# Patient Record
Sex: MALE | Race: WHITE | NOT HISPANIC OR LATINO | Employment: OTHER | URBAN - METROPOLITAN AREA
[De-identification: names, ages, dates, MRNs, and addresses within clinical notes are randomized per-mention and may not be internally consistent; named-entity substitution may affect disease eponyms.]

---

## 2023-06-25 ENCOUNTER — TELEPHONE (OUTPATIENT)
Dept: BEHAVIORAL HEALTH | Facility: CLINIC | Age: 43
End: 2023-06-25

## 2023-06-25 ENCOUNTER — TELEPHONE (OUTPATIENT)
Dept: BEHAVIORAL HEALTH | Facility: HOSPITAL | Age: 43
End: 2023-06-25

## 2023-06-25 ENCOUNTER — HOSPITAL ENCOUNTER (INPATIENT)
Facility: HOSPITAL | Age: 43
LOS: 3 days | Discharge: HOME | DRG: 885 | End: 2023-06-28
Attending: STUDENT IN AN ORGANIZED HEALTH CARE EDUCATION/TRAINING PROGRAM | Admitting: STUDENT IN AN ORGANIZED HEALTH CARE EDUCATION/TRAINING PROGRAM
Payer: MEDICARE

## 2023-06-25 PROBLEM — R45.851 SUICIDAL IDEATION: Status: ACTIVE | Noted: 2023-06-25

## 2023-06-25 PROCEDURE — 250N000013 HC RX MED GY IP 250 OP 250 PS 637: Performed by: NURSE PRACTITIONER

## 2023-06-25 PROCEDURE — 124N000001 HC R&B MH

## 2023-06-25 RX ORDER — OLANZAPINE 5 MG/1
5 TABLET ORAL AT BEDTIME
Status: DISCONTINUED | OUTPATIENT
Start: 2023-06-25 | End: 2023-06-28 | Stop reason: HOSPADM

## 2023-06-25 RX ORDER — LAMOTRIGINE 200 MG/1
200 TABLET ORAL AT BEDTIME
COMMUNITY

## 2023-06-25 RX ORDER — NICOTINE 21 MG/24HR
1 PATCH, TRANSDERMAL 24 HOURS TRANSDERMAL DAILY
Status: DISCONTINUED | OUTPATIENT
Start: 2023-06-25 | End: 2023-06-28 | Stop reason: HOSPADM

## 2023-06-25 RX ORDER — LAMOTRIGINE 25 MG/1
25 TABLET ORAL EVERY MORNING
COMMUNITY

## 2023-06-25 RX ORDER — FOLIC ACID 1 MG/1
1 TABLET ORAL EVERY MORNING
COMMUNITY

## 2023-06-25 RX ORDER — LANOLIN ALCOHOL/MO/W.PET/CERES
3 CREAM (GRAM) TOPICAL
Status: DISCONTINUED | OUTPATIENT
Start: 2023-06-25 | End: 2023-06-28 | Stop reason: HOSPADM

## 2023-06-25 RX ORDER — LAMOTRIGINE 100 MG/1
200 TABLET ORAL AT BEDTIME
Status: DISCONTINUED | OUTPATIENT
Start: 2023-06-25 | End: 2023-06-28 | Stop reason: HOSPADM

## 2023-06-25 RX ORDER — DIVALPROEX SODIUM 500 MG/1
1500 TABLET, EXTENDED RELEASE ORAL DAILY
Status: DISCONTINUED | OUTPATIENT
Start: 2023-06-25 | End: 2023-06-28 | Stop reason: HOSPADM

## 2023-06-25 RX ORDER — NICOTINE 21 MG/24HR
1 PATCH, TRANSDERMAL 24 HOURS TRANSDERMAL DAILY PRN
Status: ON HOLD | COMMUNITY
End: 2023-06-28

## 2023-06-25 RX ORDER — OLANZAPINE 10 MG/1
10 TABLET ORAL 3 TIMES DAILY PRN
Status: DISCONTINUED | OUTPATIENT
Start: 2023-06-25 | End: 2023-06-28 | Stop reason: HOSPADM

## 2023-06-25 RX ORDER — HYDROXYZINE HYDROCHLORIDE 25 MG/1
25 TABLET, FILM COATED ORAL EVERY 4 HOURS PRN
Status: DISCONTINUED | OUTPATIENT
Start: 2023-06-25 | End: 2023-06-28 | Stop reason: HOSPADM

## 2023-06-25 RX ORDER — OLANZAPINE 10 MG/1
5 TABLET ORAL AT BEDTIME
COMMUNITY

## 2023-06-25 RX ORDER — DIVALPROEX SODIUM 500 MG/1
1500 TABLET, EXTENDED RELEASE ORAL AT BEDTIME
COMMUNITY

## 2023-06-25 RX ORDER — HYDROXYZINE PAMOATE 25 MG/1
25 CAPSULE ORAL 3 TIMES DAILY PRN
COMMUNITY

## 2023-06-25 RX ORDER — MAGNESIUM HYDROXIDE/ALUMINUM HYDROXICE/SIMETHICONE 120; 1200; 1200 MG/30ML; MG/30ML; MG/30ML
30 SUSPENSION ORAL EVERY 4 HOURS PRN
Status: DISCONTINUED | OUTPATIENT
Start: 2023-06-25 | End: 2023-06-28 | Stop reason: HOSPADM

## 2023-06-25 RX ORDER — ACETAMINOPHEN 325 MG/1
650 TABLET ORAL EVERY 4 HOURS PRN
Status: DISCONTINUED | OUTPATIENT
Start: 2023-06-25 | End: 2023-06-28 | Stop reason: HOSPADM

## 2023-06-25 RX ORDER — NALTREXONE HYDROCHLORIDE 50 MG/1
100 TABLET, FILM COATED ORAL DAILY
Status: DISCONTINUED | OUTPATIENT
Start: 2023-06-25 | End: 2023-06-28 | Stop reason: HOSPADM

## 2023-06-25 RX ORDER — GABAPENTIN 400 MG/1
400 CAPSULE ORAL AT BEDTIME
Status: DISCONTINUED | OUTPATIENT
Start: 2023-06-25 | End: 2023-06-28 | Stop reason: HOSPADM

## 2023-06-25 RX ORDER — GABAPENTIN 400 MG/1
400 CAPSULE ORAL AT BEDTIME
COMMUNITY

## 2023-06-25 RX ORDER — OLANZAPINE 10 MG/2ML
10 INJECTION, POWDER, FOR SOLUTION INTRAMUSCULAR 3 TIMES DAILY PRN
Status: DISCONTINUED | OUTPATIENT
Start: 2023-06-25 | End: 2023-06-28 | Stop reason: HOSPADM

## 2023-06-25 RX ORDER — NALTREXONE HYDROCHLORIDE 50 MG/1
100 TABLET, FILM COATED ORAL AT BEDTIME
COMMUNITY

## 2023-06-25 RX ORDER — ASPIRIN 81 MG/1
81 TABLET ORAL 2 TIMES DAILY
COMMUNITY

## 2023-06-25 RX ADMIN — DIVALPROEX SODIUM 1500 MG: 500 TABLET, FILM COATED, EXTENDED RELEASE ORAL at 20:40

## 2023-06-25 RX ADMIN — GABAPENTIN 400 MG: 400 CAPSULE ORAL at 20:40

## 2023-06-25 RX ADMIN — LAMOTRIGINE 200 MG: 100 TABLET ORAL at 20:40

## 2023-06-25 RX ADMIN — HYDROXYZINE HYDROCHLORIDE 25 MG: 25 TABLET, FILM COATED ORAL at 17:57

## 2023-06-25 RX ADMIN — NALTREXONE HYDROCHLORIDE 100 MG: 50 TABLET, FILM COATED ORAL at 20:40

## 2023-06-25 RX ADMIN — OLANZAPINE 5 MG: 5 TABLET, FILM COATED ORAL at 20:40

## 2023-06-25 ASSESSMENT — ACTIVITIES OF DAILY LIVING (ADL)
ADLS_ACUITY_SCORE: 30
WEAR_GLASSES_OR_BLIND: YES
FALL_HISTORY_WITHIN_LAST_SIX_MONTHS: NO
ADLS_ACUITY_SCORE: 30
DRESSING/BATHING_DIFFICULTY: NO
TOILETING_ISSUES: NO
VISION_MANAGEMENT: GLASSES
WALKING_OR_CLIMBING_STAIRS_DIFFICULTY: NO
DOING_ERRANDS_INDEPENDENTLY_DIFFICULTY: NO
ADLS_ACUITY_SCORE: 30
CONCENTRATING,_REMEMBERING_OR_MAKING_DECISIONS_DIFFICULTY: NO
ADLS_ACUITY_SCORE: 30
DIFFICULTY_EATING/SWALLOWING: NO

## 2023-06-25 NOTE — TELEPHONE ENCOUNTER
1:35p Intake received call from [facility] (Kika) informing that pt is admitting to [address] from [facility].  is requesting Indicia to be completed for pt. Intake inquired if Registration has been notified and did [facility] call for report,  confirmed they have.    1:36p Intake notes pt's Indicia has been completed.

## 2023-06-25 NOTE — TELEPHONE ENCOUNTER
S: Outside Facility [facility] , Unit JERONIMO BAINS calling at 1000.  42 year old/Male presenting with Suicidal Ideation with plan    B: Pt arrived via self-transport. Pt presents with Suicidal ideation with plan.  Pt affect in ED: Calm, cooperative, pleasant  Pt Dx: Major Depressive Disorder, Bipolar Disorder and Anxiety Disorder  Previous IPMH hx? Yes most recently October 2022  Pt endorses SI. Pt denies SIB.   Pt denies HI. Pt endorses hallucinations.   Hx of suicide attempt? Yes  Hx of aggression, or current concerns for aggression this visit? No  Pt is prescribed medication. Pt is medication compliant  Pt endorses OP services.  CD concerns: Yes  Acute medical concerns: No  Does Pt present with any of the following: assistive devices, insulin pump, J/G tube, catheter, CPAP, continuous IV, continuous O2, bariatric needs, ADA needs? No  Is Pt their own guardian? Yes  Pt is ambulatory  Pt is  able to perform ADLs independently    A: Pt meets criteria for review for IP admission. Patient is voluntary.   COVID: Negative  Utox: Negative  CMP: Abnormalities: BUN- 27, AST- 41, ALT- 44  CBC: Abnormalities: RBC- 5.84  HCG: N/A    R: Patient accepted for behavioral bed placement: Yes        Accepted by Provider Shawna Alvares    Admission to Inpatient Level of Care is indicated due to:     1. Patient risk of severity of behavioral health disorder is appropriate to proposed level of care as indicated by:   a. Imminent risk of harm to self Yes describe: SI with plan  b. Imminent risk of harm to others No describe:   And/or  Behavioral health disorder is present and appropriate for inpatient care with both of the following:   a.  Severe psychiatric, behavioral or other comorbid conditions: Yes describe: Auditory hallucinations saying to kill himself  b.  Severe dysfunction in daily living is present: No describe:   2.  Inpatient mental health services are necessary to meet patient needs based on:   a.  Specific condition related to admission diagnosis is present and will likely improve with treatment at an inpatient level of care: Yes  b. Specific condition related to admission diagnosis will likely deteriorate in the absence of treatment at an inpatient level of care: Yes    3.  Situation and expectations are appropriate for inpatient care, as indicated by  one of the following:     A. Patient is unwilling to participate in treatment voluntarily and requires treatment. Yes   B. Is voluntary treatment at lower level of care feasible No  C. Around the clock medical and nursing care is for symptoms is required: Yes  D. Patient management at lower level of care is not appropriate and  biopsychosocial stressors may be contributing to clinical presentation. Yes

## 2023-06-25 NOTE — PROGRESS NOTES
List items sent to safe: Black wallet with 2 chains, cell phone in case with small crack on the screen protector,[place] license, [employer] credit union card, [place] guest card, [employer] laundry card, logic card, 3- receipts, DNR go wild receipt, $4.00 cash, $1.56 in change.  All other belongings put in assigned cubby in belongings room.       I have reviewed my belongings list on admission and verify that it is correct.     Patient signature_______________________________    Second staff witness (if patient unable to sign) ______________________________       I have received all my belongings at discharge.    Patient signature________________________________    Lynda   6/25/2023  4:06 PM

## 2023-06-25 NOTE — PLAN OF CARE
"ADMISSION NOTE    Reason for admission: Suicidal Ideation with plan to overdose.  Safety concerns: Previous overdose attempts, etoh abuse- denies any withdrawal symptoms and/or DTs.  Risk for or history of violence: None reported.   Full skin assessment: Completed- no open areas or bruising noted. Multiple tattoos throughout    Patient arrived on unit from [facility] accompanied by transportation staff and security on 6/25/2023 1510 PM.   Status on arrival: Calm, cooperative, A & O x4, ambulatory with a steady gait  /85   Pulse 66   Temp (!) 96.6  F (35.9  C) (Tympanic)   Resp 18   Ht 1.905 m (6' 3\")   Wt 87.8 kg (193 lb 9.6 oz)   SpO2 98%   BMI 24.20 kg/m    Patient given tour of unit and Welcome to  unit papers given to patient, wanding completed, belongings inventoried, and admission assessment completed.   Patient's legal status on arrival is voluntary. Appropriate legal rights discussed with and copy given to patient. Patient Bill of Rights discussed with and copy given to patient.   Patient denies SI, HI, and thoughts of self harm and contracts for safety while on unit.      Kika Hamilton RN  6/25/2023  3:38 PM            "

## 2023-06-25 NOTE — PROGRESS NOTES
06/25/23 1619   Patient Belongings   Did you bring any home meds/supplements to the hospital?  No   Patient Belongings remains with patient;locker;sent to security per site process   Patient Belongings Remaining with Patient glasses   Patient Belongings Put in Hospital Secure Location (Security or Locker, etc.) cash/credit card;cell phone/electronics;clothing;money (see comment);glasses;purse/wallet;shoes;plastic bag;wallet   Belongings Search Yes   Clothing Search Yes   Comment chary shorts, , black tank top, boxers, socks, black shoes, black plastic bag, receipt, guitar pick, 3 keys on key ring with 3 keychains, lighter, cigarettes     List items sent to safe: black wallet with 2 chains, cell phone in case with small crack on screen protector, [place] license, [employer] credit union card, [place] quest card, [employer] laundry card, logic card, 3 receipts, DNR go wild receipt, $4.00 cash, $1.56 in change.  All other belongings put in assigned cubby in belongings room.       I have reviewed my belongings list on admission and verify that it is correct.     Patient signature_______________________________    Second staff witness (if patient unable to sign) ______________________________       I have received all my belongings at discharge.    Patient signature________________________________    Lynda  6/25/2023  4:21 PM

## 2023-06-26 LAB — VALPROATE SERPL-MCNC: 67.2 UG/ML

## 2023-06-26 PROCEDURE — 99223 1ST HOSP IP/OBS HIGH 75: CPT | Mod: AI | Performed by: PSYCHIATRY & NEUROLOGY

## 2023-06-26 PROCEDURE — 124N000001 HC R&B MH

## 2023-06-26 PROCEDURE — 36415 COLL VENOUS BLD VENIPUNCTURE: CPT | Performed by: PSYCHIATRY & NEUROLOGY

## 2023-06-26 PROCEDURE — 250N000013 HC RX MED GY IP 250 OP 250 PS 637: Performed by: NURSE PRACTITIONER

## 2023-06-26 PROCEDURE — 80164 ASSAY DIPROPYLACETIC ACD TOT: CPT | Performed by: PSYCHIATRY & NEUROLOGY

## 2023-06-26 RX ORDER — MULTIVITAMIN,THERAPEUTIC
1 TABLET ORAL EVERY MORNING
COMMUNITY

## 2023-06-26 RX ORDER — CREATINE 100 %
1 POWDER (GRAM) MISCELLANEOUS
Status: ON HOLD | COMMUNITY
End: 2023-06-28

## 2023-06-26 RX ORDER — LAMOTRIGINE 25 MG/1
25 TABLET ORAL EVERY MORNING
Status: DISCONTINUED | OUTPATIENT
Start: 2023-06-27 | End: 2023-06-28 | Stop reason: HOSPADM

## 2023-06-26 RX ORDER — DIPHENHYDRAMINE HCL 25 MG
25 TABLET ORAL EVERY MORNING
Status: ON HOLD | COMMUNITY
End: 2023-06-28

## 2023-06-26 RX ADMIN — DIVALPROEX SODIUM 1500 MG: 500 TABLET, FILM COATED, EXTENDED RELEASE ORAL at 09:29

## 2023-06-26 RX ADMIN — NICOTINE 1 PATCH: 21 PATCH, EXTENDED RELEASE TRANSDERMAL at 09:29

## 2023-06-26 RX ADMIN — NALTREXONE HYDROCHLORIDE 100 MG: 50 TABLET, FILM COATED ORAL at 09:29

## 2023-06-26 RX ADMIN — LAMOTRIGINE 200 MG: 100 TABLET ORAL at 21:33

## 2023-06-26 RX ADMIN — OLANZAPINE 5 MG: 5 TABLET, FILM COATED ORAL at 21:33

## 2023-06-26 RX ADMIN — GABAPENTIN 400 MG: 400 CAPSULE ORAL at 21:33

## 2023-06-26 ASSESSMENT — ACTIVITIES OF DAILY LIVING (ADL)
ADLS_ACUITY_SCORE: 30
HYGIENE/GROOMING: INDEPENDENT
ADLS_ACUITY_SCORE: 30
DRESS: INDEPENDENT
ADLS_ACUITY_SCORE: 30
ORAL_HYGIENE: INDEPENDENT
DRESS: INDEPENDENT
ORAL_HYGIENE: INDEPENDENT
ADLS_ACUITY_SCORE: 30
HYGIENE/GROOMING: INDEPENDENT
ADLS_ACUITY_SCORE: 30
ADLS_ACUITY_SCORE: 30
LAUNDRY: UNABLE TO COMPLETE

## 2023-06-26 NOTE — PLAN OF CARE
PT reports that his AH are not currently present and he denies SI throughout shift, reporting that is gone as well. He denies HI  He requests Hydroxyzine for anxiety which he reports is the only thing he is having right now. He received PRN Atarax 25mg at 1757.   He denies pain.   He attended group and played solitaire while in the group room.   He was appropriate with other patients and staff but somewhat withdrawn.       He restarted his PTA medications for HS.      Face to face end of shift report communicated to oncoming RN     Anya Romo RN  6/25/2023  11:26 PM           Problem: Adult Behavioral Health Plan of Care  Goal: Patient-Specific Goal (Individualization)  Description: Patient will be compliant with medication administrations and treatment team recommendations during hospitalization.  Patient will remain independent with ADLs daily.  Patient will sleep 6-8 hours per night.  Patient will eat at least 50% of meals daily.  Patient will attend at least 50% of unit programming daily.   Outcome: Progressing     Problem: Suicide Risk  Goal: Absence of Self-Harm  Description: Patient will deny SI by discharge.  Patient will remain free from self harm/injury during hospitalization.   Outcome: Progressing     Problem: Thought Process Alteration  Goal: Optimal Thought Clarity  Outcome: Progressing

## 2023-06-26 NOTE — PLAN OF CARE
Face to face end of shift report will be communicated to oncoming RN.    Problem: Adult Behavioral Health Plan of Care  Goal: Patient-Specific Goal (Individualization)  Description: Patient will be compliant with medication administrations and treatment team recommendations during hospitalization.  Patient will remain independent with ADLs daily.  Patient will sleep 6-8 hours per night.  Patient will eat at least 50% of meals daily.  Patient will attend at least 50% of unit programming daily.   Outcome: Progressing     Problem: Suicide Risk  Goal: Absence of Self-Harm  Description: Patient will deny SI by discharge.  Patient will remain free from self harm/injury during hospitalization.   Outcome: Progressing     Problem: Thought Process Alteration  Goal: Optimal Thought Clarity  Outcome: Progressing      Face to face end of shift report obtained from TARSHA Joyner. Pt observed resting in bed.  Pt appears to be sleeping in bed with eyes closed and having position changes. 15 minutes and PRN safety checks completed with no noted complains. No self harm or delusional comments noted or reported so far this shift.  0600-Pt appeared to had slept 6 hours.

## 2023-06-26 NOTE — DISCHARGE INSTRUCTIONS
Behavioral Discharge Planning and Instructions    Summary: Jose Raul Owens is a 42 year old male with a past psychiatric history notable for bipolar I disorder, alcohol use disorder. Multiple prior inpatient psychiatric hospitalizations.     Main Diagnosis:   Bipolar I Disorder, Current Episode Depressed  Alcohol Use Disorder, Severe    Health Care Follow-up:     Resources:   [place] AA: [email or web address]     AA [place] Meeting : [email or web address]     Alcoholics Resource [place] (meeting finder for [place], [place]): [email or web address] Recovery: [email or web address]    Attend all scheduled appointments with your outpatient providers. Call at least 24 hours in advance if you need to reschedule an appointment to ensure continued access to your outpatient providers.     Major Treatments, Procedures and Findings:  You were provided with: a psychiatric assessment, assessed for medical stability, medication evaluation and/or management, group therapy, family therapy, individual therapy, CD evaluation/assessment, milieu management, and medical interventions    Symptoms to Report: feeling more aggressive, increased confusion, losing more sleep, mood getting worse, or thoughts of suicide    Early warning signs can include: increased depression or anxiety sleep disturbances increased thoughts or behaviors of suicide or self-harm  increased unusual thinking, such as paranoia or hearing voices    Safety and Wellness:  Take all medicines as directed.  Make no changes unless your doctor suggests them.      Follow treatment recommendations.  Refrain from alcohol and non-prescribed drugs.  Ask your support system to help you reduce your access to items that could harm yourself or others. Items could include:  Firearms  Medicines (both prescribed and over-the-counter)  Knives and other sharp objects  Ropes and  "like materials  Car keys  If there is a concern for safety, call 911. If there is a concern for safety, call 911.    Resources:   Crisis Intervention: 520.712.4539 or 169-585-6536 (TTY: 796.830.7239).  Call anytime for help.  National Tensed on Mental Illness (www.[place].giuseppe.org): 721.853.2189 or 625-644-6365.  [place] Association for Children's Mental Health (www.mac.org): 629.955.4627.  Alcoholics Anonymous (www.alcoholics-anonymous.org): Check your phone book for your local chapter.  Suicide Awareness Voices of Education (SAVE) (www.save.org): 423-803-FHXJ (8101)  National Suicide Prevention Line (www.mentalhealthmn.org): 151-095-EOIW (6130)  Mental Health Consumer/Survivor Network of [place] (www.mhcsn.net): 119.756.1176 or 740-569-7424  Mental Health Association of [place] (www.mentalhealth.org): 647.198.1722 or 635-147-4296  Self- Management and Recovery Training., SMART-- Toll free: 805.447.8740  www.Avista.Relayr  Text 4 Life: txt \"LIFE\" to 01221 for immediate support and crisis intervention  Crisis text line: Text \"[place]\" to 700526. Free, confidential, 24/7.  Crisis Intervention: 679.879.3622 or 602-103-0067. Call anytime for help.     General Medication Instructions:   See your medication sheet(s) for instructions.   Take all medicines as directed.  Make no changes unless your doctor suggests them.   Go to all your doctor visits.  Be sure to have all your required lab tests. This way, your medicines can be refilled on time.  Do not use any drugs not prescribed by your doctor.  Avoid alcohol.    Advance Directives:   Scanned document on file with [facility]? No scanned doc  Is document scanned? Current scanned  Honoring Choices Your Rights Handout: Informed and given  Was more information offered? Pt declined    The Treatment team has appreciated the opportunity to work with you. If you have any questions or concerns about your recent admission, you can contact the unit which can receive your call 24 hours a day, 7 days a " week. They will be able to get in touch with a Provider if needed. The unit number is *** .
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
## 2023-06-26 NOTE — PLAN OF CARE
Face to face end of shift report received from Cleo MOSQUEDA RN. Rounding completed. Patient observed in lounge.     Pt has been calm, cooperative this shift. He is withdrawn and keeps to himself for most of the shift. He sits out in the lounge throughout the shift. He has attended groups throughout the day. Behaviors have been appropriate. He denied any SI/HI and AH/VH. Denied pain. He has taken all medications as prescribed. Steady gait- no falls. He is able to make his needs known. Frequent rounding.       Problem: Adult Behavioral Health Plan of Care  Goal: Patient-Specific Goal (Individualization)  Description: Patient will be compliant with medication administrations and treatment team recommendations during hospitalization.  Patient will remain independent with ADLs daily.  Patient will sleep 6-8 hours per night.  Patient will eat at least 50% of meals daily.  Patient will attend at least 50% of unit programming daily.   6/26/2023 1459 by Kika Hamilton RN  Outcome: Progressing     Problem: Suicide Risk  Goal: Absence of Self-Harm  Description: Patient will deny SI by discharge.  Patient will remain free from self harm/injury during hospitalization.   6/26/2023 1459 by Kika Hamilton RN  Outcome: Progressing     Problem: Thought Process Alteration  Goal: Optimal Thought Clarity  6/26/2023 1459 by Kika Hamilton RN  Outcome: Progressing    Kika Hamilton RN  6/26/2023  2:54 PM

## 2023-06-26 NOTE — MEDICATION SCRIBE - ADMISSION MEDICATION HISTORY
Medication Scribe Admission Medication History    Admission medication history is complete. The information provided in this note is only as accurate as the sources available at the time of the update.    Medication reconciliation/reorder completed by provider prior to medication history? Yes    Information Source(s): Patient and CareEverywhere/SureScripts via in-person    Pertinent Information:     Naltrexone- pt reports he has only been taking 50 mg nightly, did not know it was prescribed 100 mg.     Nicotine inhaler- ran out and has not refilled    Olanzapine- reports he was told by a provider to take 10 mg nightly for 2 weeks (approx 1 week ago) to assist with sleep and then resume 5 mg.       Changes made to PTA medication list:    Added: d3, multivitamin, creatine, benadryl    Deleted: None    Changed: updated time of day and missing information.     Medication Affordability:  Not including over the counter (OTC) medications, was there a time in the past 3 months when you did not take your medications as prescribed because of cost?: No    Allergies reviewed with patient and updates made in EHR: yes    Medication History Completed By: Beverly Poole 6/26/2023 2:23 PM    Prior to Admission medications    Medication Sig Last Dose Taking? Auth Provider Long Term End Date   aspirin 81 MG EC tablet Take 81 mg by mouth 2 times daily 6/25/2023 at 0957 ER dose Yes Reported, Patient     Creatine POWD Take 1 Scoop by mouth five times a week -weight lifting sessions. Past Week Yes Reported, Patient     diphenhydrAMINE (BENADRYL) 25 MG tablet Take 25 mg by mouth every morning -allergies Past Week Yes Reported, Patient     divalproex sodium extended-release (DEPAKOTE ER) 500 MG 24 hr tablet Take 1,500 mg by mouth At Bedtime . Swallow whole; do not crush or chew. 6/24/2023 at 0041 ER dose Yes Reported, Patient Yes    folic acid (FOLVITE) 1 MG tablet Take 1 mg by mouth every morning 6/25/2023 at 0957 Er dose Yes Reported,  Patient     gabapentin (NEURONTIN) 400 MG capsule Take 400 mg by mouth At Bedtime 6/25/2023 at 0041 ER dose Yes Reported, Patient Yes    hydrOXYzine (VISTARIL) 25 MG capsule Take 25 mg by mouth 3 times daily as needed for anxiety 6/24/2023 Yes Reported, Patient     lamoTRIgine (LAMICTAL) 200 MG tablet Take 200 mg by mouth At Bedtime 6/25/2023 at 0041 ER dose Yes Reported, Patient Yes    lamoTRIgine (LAMICTAL) 25 MG tablet Take 25 mg by mouth every morning 6/25/2023 at 0957 ER Yes Reported, Patient Yes    multivitamin, therapeutic (THERA-VIT) TABS tablet Take 1 tablet by mouth every morning Past Week Yes Reported, Patient     naltrexone (DEPADE/REVIA) 50 MG tablet Take 100 mg by mouth At Bedtime Past Week at HS Yes Reported, Patient Yes    nicotine (NICODERM CQ) 21 MG/24HR 24 hr patch Place 1 patch onto the skin daily as needed for smoking cessation 6/25/2023 at 0046 ER Yes Reported, Patient     OLANZapine (ZYPREXA) 10 MG tablet Take 5 mg by mouth At Bedtime 6/25/2023 at 0041 ER Yes Reported, Patient Yes    omeprazole (PRILOSEC) 20 MG DR capsule Take 20 mg by mouth every morning (before breakfast) 6/25/2023 at 0041 ER Yes Reported, Patient     Vitamin D3 (CHOLECALCIFEROL) 125 MCG (5000 UT) tablet Take 1 tablet by mouth daily Past Week Yes Reported, Patient     nicotine (NICOTROL) 10 MG inhaler Puff frequently, up to 80 puffs over 20 minutes for cravings to smoke. Maximum of 16 cartridges per day.  Patient not taking: Reported on 6/26/2023 Not Taking  Reported, Patient
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
## 2023-06-26 NOTE — PLAN OF CARE
"Face to face shift report received from Kika CABALLERO RN. Rounding completed, pt observed.    Problem: Adult Behavioral Health Plan of Care  Goal: Patient-Specific Goal (Individualization)  Description: Patient will be compliant with medication administrations and treatment team recommendations during hospitalization.  Patient will remain independent with ADLs daily.  Patient will sleep 6-8 hours per night.  Patient will eat at least 50% of meals daily.  Patient will attend at least 50% of unit programming daily.   Outcome: Progressing  Note: Shift Summary:  Patient was in the group room speaking with MHP at the start of this shift. Patient is calm and cooperative. Patient denies current suicidal ideation or intent. States he lives in [facility] and went to [place] to get into a hospital quicker.  States he is more interested in getting some support AA resources vs any in or out patient treatment.  Admits to drinking a liter once a week in one sitting.  Stated he can never just stop after 1-2 drinks but exresses that he had been sober for 4 months before relapsing.  States \"a lot of bad things have happened to me in June\". Nurse validated his feelings.  Patient is med compliant and attends group activities.  Mood is cooperative.     Problem: Suicide Risk  Goal: Absence of Self-Harm  Description: Patient will deny SI by discharge.  Patient will remain free from self harm/injury during hospitalization.   Outcome: Progressing     Problem: Thought Process Alteration  Goal: Optimal Thought Clarity  Description: Patient will be able to hold a reality based and relevant conversation.  Outcome: Progressing  Face to face report will be communicated to oncoming RN.    Jess Fajardo RN  6/26/2023                            "

## 2023-06-27 PROCEDURE — 250N000013 HC RX MED GY IP 250 OP 250 PS 637: Performed by: NURSE PRACTITIONER

## 2023-06-27 PROCEDURE — 250N000013 HC RX MED GY IP 250 OP 250 PS 637: Performed by: PSYCHIATRY & NEUROLOGY

## 2023-06-27 PROCEDURE — 99233 SBSQ HOSP IP/OBS HIGH 50: CPT | Mod: 95 | Performed by: PSYCHIATRY & NEUROLOGY

## 2023-06-27 PROCEDURE — 124N000001 HC R&B MH

## 2023-06-27 RX ADMIN — NICOTINE 1 PATCH: 21 PATCH, EXTENDED RELEASE TRANSDERMAL at 08:21

## 2023-06-27 RX ADMIN — NALTREXONE HYDROCHLORIDE 100 MG: 50 TABLET, FILM COATED ORAL at 08:21

## 2023-06-27 RX ADMIN — LAMOTRIGINE 25 MG: 25 TABLET ORAL at 08:21

## 2023-06-27 RX ADMIN — LAMOTRIGINE 200 MG: 100 TABLET ORAL at 21:30

## 2023-06-27 RX ADMIN — DIVALPROEX SODIUM 1500 MG: 500 TABLET, FILM COATED, EXTENDED RELEASE ORAL at 08:21

## 2023-06-27 RX ADMIN — ACETAMINOPHEN 650 MG: 325 TABLET ORAL at 21:35

## 2023-06-27 RX ADMIN — GABAPENTIN 400 MG: 400 CAPSULE ORAL at 21:30

## 2023-06-27 RX ADMIN — OLANZAPINE 5 MG: 5 TABLET, FILM COATED ORAL at 21:30

## 2023-06-27 ASSESSMENT — ACTIVITIES OF DAILY LIVING (ADL)
ADLS_ACUITY_SCORE: 30
ADLS_ACUITY_SCORE: 30
ORAL_HYGIENE: INDEPENDENT
ADLS_ACUITY_SCORE: 30
HYGIENE/GROOMING: INDEPENDENT
ADLS_ACUITY_SCORE: 30
ADLS_ACUITY_SCORE: 30
DRESS: INDEPENDENT
ADLS_ACUITY_SCORE: 30
HYGIENE/GROOMING: INDEPENDENT
ADLS_ACUITY_SCORE: 30
ADLS_ACUITY_SCORE: 30
LAUNDRY: UNABLE TO COMPLETE
ORAL_HYGIENE: INDEPENDENT
ADLS_ACUITY_SCORE: 30
DRESS: INDEPENDENT
ADLS_ACUITY_SCORE: 30

## 2023-06-27 NOTE — PROGRESS NOTES
"  [facility] PSYCHIATRY  -  PROGRESS NOTE     ID   Name: Krystian Owens  MRN#: [id number]     SUBJECTIVE   Prior to interviewing the patient, I met with nursing and reviewed patient's clinical condition. We discussed clinical care both before and after the interview. I have reviewed the patient's clinical course by review of records including previous notes, labs, and vital signs.     Per nursing, the patient had the following behavioral events over the last 24-hours: none, attending groups    On psychiatric interview, he states \"I am doing pretty\".  Discussed his depakote level.  Reports he had a manic episode   6-7 weeks ago\".  Reports his Depakote was increased recently to 1,500 mg.  We discussed the possibility of increasing to 2,000 mg q daily. [place] decision to hold off for now.  He will meet with CD  today. Reports his suicidal thoughts are getting \"better\". Denies any physical pain.         MEDICATIONS   Scheduled Meds:    divalproex sodium extended-release  1,500 mg Oral Daily     gabapentin  400 mg Oral At Bedtime     lamoTRIgine  200 mg Oral At Bedtime     lamoTRIgine  25 mg Oral QAM     naltrexone  100 mg Oral Daily     nicotine  1 patch Transdermal Daily     nicotine   Transdermal Q8H     OLANZapine  5 mg Oral At Bedtime     PRN Meds:.acetaminophen, alum & mag hydroxide-simethicone, hydrOXYzine, melatonin, nicotine, OLANZapine **OR** OLANZapine     ALLERGIES   Allergies   Allergen Reactions     Latex Swelling     Contact with Latex has caused throat swelling        VITALS   Vitals: /70   Pulse 58   Temp 96.8  F (36  C) (Tympanic)   Resp 16   Ht 1.905 m (6' 3\")   Wt 87.8 kg (193 lb 9.6 oz)   SpO2 98%   BMI 24.20 kg/m       MENTAL STATUS EXAM   Appearance:  awake, alert, adequately groomed, dressed in hospital scrubs, and appeared as age stated  Attitude:  cooperative  Eye Contact:  good  Mood: \"better\"  Affect:  mood congruent  Speech:  clear, coherent  Psychomotor Behavior:  " no evidence of tardive dyskinesia, dystonia, or tics  Thought Process:  logical, linear, and goal oriented  Associations:  no loose associations  Thought Content:  passive suicidal ideation present  Insight:  limited  Judgment:  limited  Oriented to:  time, person, and place  Attention Span and Concentration:  intact  Recent and Remote Memory:  intact  Gait and Station: Normal      LABS   Recent Results (from the past 24 hour(s))   Valproic acid    Collection Time: 06/26/23  6:02 PM   Result Value Ref Range    Valproic acid 67.2   ug/mL         ASSESSMENT   Jose Raul Owens is a 42 year old male with a past psychiatric history notable for bipolar I disorder, alcohol use disorder. Multiple prior inpatient psychiatric hospitalizations. Prior CD treatments. History of DUI in 2017. Presents to outside ED with complaints of worsening depressive symptoms and increase frequency and intensity of suicidal ideation. Recently relapsed on alcohol and began drinking again after several month period of sobriety, no longer attending his 12-step program meetings on Friday evenings. Patient was subsequently transferred and accepted for inpatient psychiatric hospitalization at [facility] for further safety and further stabilization .    Daily Progress: tolerated transition to the unit. Has been attending groups. Will meet with CD  today. Depakote level at 67.2, slightly low but was just increased within last 8 weeks to address manic symptoms.  Encouraged him to continue to work with his outpatient provider and increase to 2000 mg if needed.        DIAGNOSTIC FORMULATION   #Bipolar I Disorder, Current Episode Depressed  #Alcohol Use Disorder, Severe     PLAN     Location: Unit 5   Legal Status: Orders Placed This Encounter      Voluntary    Safety Assessment:    Behavioral Orders   Procedures     Code 1 - Restrict to Unit     Routine Programming     As clinically indicated     Status 15      Every 15 minutes.      PTA medications continued/changed:   -depakote 1,500 mg at bedtime  Gabapentin 400 mg at bedtime  Lamotrigine 25 mg q AM and 200 mg at bedtime   Naltrexone 100 mg q daily  Olanzapine 5 mg at bedtime     New medications tried and stopped:   -None    New medications initiated:   -none    Today's Changes:  -CD assessment today.     Programming: Patient will be treated in a therapeutic milieu with appropriate individual and group therapies. Education will be provided on diagnoses, medications, and treatments. Encouraged behavioral activation and participation in group programming.     Medical diagnoses:  Per medicine    Disposition: pending clinical course        TREATMENT TEAM CARE PLAN     Progress: Continued symptoms.    Continued Stay Criteria/Rationale: Continued symptoms without sufficient improvement/resolution.    Medical/Physical: See above.    Precautions: See above.     Plan: Continue inpatient care with unit support and medication management.    Rationale for change in precautions or plan: NA due to no change.    Participants: Tahir Meyer MD, Nursing, SW, OT.    The patient's care was discussed with the treatment team and chart notes were reviewed.       ATTESTATION    Tahir Meyer MD  [facility]   Psychiatry    Video Visit: Patient has given verbal consent for video visit?: Yes  Type of Service: video visit for mental health treatment  Reason for Video Visit: COVID-19 [place]  Originating Site (patient location): [facility]  Distant Site (provider location): Remote Location  Mode of Communication: Video Conference via [employer]  Time of Service: Date: 06/27/2023 , Start: 07:30 end: 08:00

## 2023-06-27 NOTE — CONSULTS
"Met with pt for CD Consult and introduced self and role in pt's care.  Pt declined CD assessment for referral to treatment, reporting \"I already know I'm an alcoholic\" and that he would not be interested in either IP or OP CD treatment.    Pt reported he is interested in CD Resources, especially AA as this has been helpful for him before.  This writer discussed how to find meetings in his area, which pt reported interest in.  This writer relayed they will send resources to pt via secure e-mail (pt reports his e-mail is [email or web address]) highlighting local intergroup, general AA meeting finder, and SMART Recovery meeting finder as an alternative to AA support meetings as well.  Pt was open to this, and denied interest in any additional CD resources.  CD Resources sent via secure e-mail, including the following:    [employer] [facility] AA: [email or web address]    AA [place] Meeting : [email or web address]    Alcohol[facility] Resource Center (meeting finder for [place], [place]): [email or web address]://[email or web address]/aa-meetings[email or web address]/[facility] Recovery: [email or web address]/[email or web address]    RUTH Lees, [facility]  Chemical Dependency Evaluation Counselor  Email: [email or web address]        "

## 2023-06-27 NOTE — PLAN OF CARE
Face to face shift report received from Kika CABALLERO RN. Rounding completed, pt observed.    Problem: Adult Behavioral Health Plan of Care  Goal: Patient-Specific Goal (Individualization)  Description: Patient will be compliant with medication administrations and treatment team recommendations during hospitalization.  Patient will remain independent with ADLs daily.  Patient will sleep 6-8 hours per night.  Patient will eat at least 50% of meals daily.  Patient will attend at least 50% of unit programming daily.   Outcome: Progressing  Note: Shift Summary:  Patient was in group at the start of this shift.  Patient is social with peers and cooperative with staff.  Denies suicidal ideation or intent. Tylenol 650 mg at HS for complaints of pain.  Conversations are reality based and relevant. Gait is balanced and steady.     Problem: Suicide Risk  Goal: Absence of Self-Harm  Description: Patient will deny SI by discharge.  Patient will remain free from self harm/injury during hospitalization.   Outcome: Progressing     Problem: Thought Process Alteration  Goal: Optimal Thought Clarity  Description: Patient will be able to hold a reality based and relevant conversation.  Outcome: Progressing  Face to face report will be communicated to oncoming RN.    Jess Fajardo RN  6/27/2023

## 2023-06-27 NOTE — PLAN OF CARE
Problem: Adult Behavioral Health Plan of Care  Goal: Patient-Specific Goal (Individualization)  Description: Patient will be compliant with medication administrations and treatment team recommendations during hospitalization.  Patient will remain independent with ADLs daily.  Patient will sleep 6-8 hours per night.  Patient will eat at least 50% of meals daily.  Patient will attend at least 50% of unit programming daily.   Outcome: Progressing   Goal Outcome Evaluation:                  Face to face shift report received from RN. Rounding completed, pt observed.Client rested in room for 7 hours with eyes closed and respirations noted.Face to face report will be communicated to oncoming RN.    Chuy Mejia RN  6/27/2023  6:55 AM

## 2023-06-28 VITALS
OXYGEN SATURATION: 98 % | BODY MASS INDEX: 24.07 KG/M2 | SYSTOLIC BLOOD PRESSURE: 121 MMHG | HEIGHT: 75 IN | WEIGHT: 193.6 LBS | DIASTOLIC BLOOD PRESSURE: 72 MMHG | TEMPERATURE: 96.9 F | RESPIRATION RATE: 16 BRPM | HEART RATE: 58 BPM

## 2023-06-28 PROCEDURE — 250N000013 HC RX MED GY IP 250 OP 250 PS 637: Performed by: PSYCHIATRY & NEUROLOGY

## 2023-06-28 PROCEDURE — 99239 HOSP IP/OBS DSCHRG MGMT >30: CPT | Mod: 95 | Performed by: PSYCHIATRY & NEUROLOGY

## 2023-06-28 PROCEDURE — 250N000013 HC RX MED GY IP 250 OP 250 PS 637: Performed by: NURSE PRACTITIONER

## 2023-06-28 RX ADMIN — NICOTINE 1 PATCH: 21 PATCH, EXTENDED RELEASE TRANSDERMAL at 08:05

## 2023-06-28 RX ADMIN — DIVALPROEX SODIUM 1500 MG: 500 TABLET, FILM COATED, EXTENDED RELEASE ORAL at 08:05

## 2023-06-28 RX ADMIN — LAMOTRIGINE 25 MG: 25 TABLET ORAL at 08:05

## 2023-06-28 RX ADMIN — NALTREXONE HYDROCHLORIDE 100 MG: 50 TABLET, FILM COATED ORAL at 08:05

## 2023-06-28 ASSESSMENT — ACTIVITIES OF DAILY LIVING (ADL)
DRESS: INDEPENDENT
ADLS_ACUITY_SCORE: 30
ADLS_ACUITY_SCORE: 30
HYGIENE/GROOMING: INDEPENDENT
ORAL_HYGIENE: INDEPENDENT
ADLS_ACUITY_SCORE: 30
ADLS_ACUITY_SCORE: 30
LAUNDRY: UNABLE TO COMPLETE
ADLS_ACUITY_SCORE: 30
ADLS_ACUITY_SCORE: 30

## 2023-06-28 NOTE — PLAN OF CARE
Face to face end of shift report received from Chuy RODRIGUEZ RN. Rounding completed. [place] [facility]     Pt has been calm, cooperative this shift. He denies any SI/HI and AH/VH. Denied pain. States he feels he is ready to discharge home. He attends groups throughout the morning and is social with peers. He has taken all medications as prescribed. Steady gait- no falls. He is able to make his needs known. Frequent rounding.     Problem: Adult Behavioral Health Plan of Care  Goal: Patient-Specific Goal (Individualization)  Description: Patient will be compliant with medication administrations and treatment team recommendations during hospitalization.  Patient will remain independent with ADLs daily.  Patient will sleep 6-8 hours per night.  Patient will eat at least 50% of meals daily.  Patient will attend at least 50% of unit programming daily.   Outcome: Progressing     Problem: Suicide Risk  Goal: Absence of Self-Harm  Description: Patient will deny SI by discharge.  Patient will remain free from self harm/injury during hospitalization.   Outcome: Progressing     Problem: Thought Process Alteration  Goal: Optimal Thought Clarity  Description: Patient will be able to hold a reality based and relevant conversation.  Outcome: Progressing     Problem: Suicidal Behavior  Goal: Suicidal Behavior is Absent or Managed  Outcome: Progressing       Kika Hamilton RN  6/28/2023  7:46 AM

## 2023-06-28 NOTE — PROGRESS NOTES
Pt is discharging at the recommendation of the treatment team. Pt is discharging to home transported by [employer]. Pt denies having any thoughts of hurting themself or anyone else. Pt denies anxiety or depression. Pt has resources listed in AVS. Discharge instructions, including; demographic sheet, psychiatric evaluation, discharge summary, and AVS were faxed to these next level of care providers.

## 2023-06-28 NOTE — PLAN OF CARE
Discharge Note    Patient Discharged to home on 6/28/2023 12:28 PM [facility] [employer] accompanied by unit assistant.     Patient informed of discharge instructions in AVS. patient verbalizes understanding and denies having any questions pertaining to AVS. Patient stable at time of discharge. Patient denies SI, HI, and thoughts of self harm at time of discharge. All personal belongings returned to patient.     Kika Hamilton RN  6/28/2023  12:30 PM

## 2023-06-28 NOTE — DISCHARGE SUMMARY
[facility] PSYCHIATRY  DISCHARGE SUMMARY     DISCHARGE DATA     Krystian Owens MRN# [id number]   Age: 42 year old YOB: 1980     Date of Admission: 6/25/2023  Date of Discharge: June 28, 2023  Discharge Provider: Tahir Meyer MD       REASON FOR ADMISSION   Jose Raul Owens is a 42 year old male with a past psychiatric history notable for bipolar I disorder, alcohol use disorder. Multiple prior inpatient psychiatric hospitalizations. Prior CD treatments. History of DUI in 2017. Presents to outside ED with complaints of worsening depressive symptoms and increase frequency and intensity of suicidal ideation. Recently relapsed on alcohol and began drinking again after several month period of sobriety, no longer attending his 12-step program meetings on Friday evenings. Patient was subsequently transferred and accepted for inpatient psychiatric hospitalization at [facility] for further safety and further stabilization       DISCHARGE DIAGNOSES   #Bipolar I Disorder, Current Episode Depressed  #Alcohol Use Disorder, Severe     CONSULTS     Declined to meet with CD . Did receive resources for AA and Smart Recovery.        HOSPITAL COURSE   Patient was admitted to unit 5 due to the aforementioned presentation. The patient was placed under 15 minute checks to ensure patient safety. The patient participated in unit programming and groups as able.    Legal status during hospitalization was voluntary .Mr. Owens did not require seclusion/restraint during hospitalization.     We reviewed with Mr. Owens current and past medication trials including duration, dose, response and side effects. During this hospitalization, the following changes to the patient's psychotropic medications were made:    PTA medications continued/changed:   -depakote 1,500 mg at bedtime (Depakote level at 67.2, slightly low but was just increased within last 8 weeks to address  manic symptoms.  Encouraged him to continue to work with his outpatient provider and increase to 2000 mg if needed. )  Gabapentin 400 mg at bedtime  Lamotrigine 25 mg q AM and 200 mg at bedtime   Naltrexone 100 mg q daily  Olanzapine 5 mg at bedtime      New medications tried and stopped:   -None     New medications initiated:   -none    Mr. Owens progressed gradually related to confidence in skills to manage symptoms and establishment of a supportive community network . By the time of discharge, his symptoms had improved adequately.  Depression improved with increased confidence in ability to manage symptoms., Suicidal ideation resolved with adequate outpatient plan in place.  and Psychotropic medications utilized were found to be helpful without significant adverse side effects. The treatment goals (long-term goal/discharge criteria) were reached.     With the aforementioned changes and supports the patient noticed improvement in their symptoms and felt sufficiently ready for discharge. As a result, Krystian Owens was discharged. At the time of discharge, Krystian Owens was determined to not be a danger to self or others. The patient was also medically stable for discharge. At the current time of discharge, the patient does not meet criteria for involuntary hospitalization. On the day of discharge, the patient reports that they do not have suicidal or homicidal ideation. Steps taken to minimize risk include: assessing patient s behavior and thought process daily during hospital stay, discharging patient with adequate plan for follow up for mental and physical health and discussing safety plan of returning to the hospital should the patient ever have thoughts of harming themselves or others. Therefore, based on all available evidence including the factors cited above, the patient does not appear to be at imminent risk for self-harm, and is appropriate for outpatient level of care. The acute crisis is  resolved and Krystian is discharging in improved condition. Though Krystian's acute crisis has resolved, there remains a higher risk of suicide over the long term compared to the general population. Factors that may be associated with relapse include worsening of depressive symptoms, alcohol use, illicit substance use, not taking medications, lack of mental health follow-up appointments and work/family/relationship stressors. Krystian Owens has a plan in place to mitigate these stressors, is hopeful about the future ,and is not assessed to be a imminent risk to self or anyone else and thus is not assessed to be holdable.  Krystian has received maximal benefit from the current hospital stay. Krystian Owens set to discharge.        DISCHARGE MEDICATIONS     Current Discharge Medication List      CONTINUE these medications which have NOT CHANGED    Details   aspirin 81 MG EC tablet Take 81 mg by mouth 2 times daily      divalproex sodium extended-release (DEPAKOTE ER) 500 MG 24 hr tablet Take 1,500 mg by mouth At Bedtime . Swallow whole; do not crush or chew.      folic acid (FOLVITE) 1 MG tablet Take 1 mg by mouth every morning      gabapentin (NEURONTIN) 400 MG capsule Take 400 mg by mouth At Bedtime      hydrOXYzine (VISTARIL) 25 MG capsule Take 25 mg by mouth 3 times daily as needed for anxiety      !! lamoTRIgine (LAMICTAL) 200 MG tablet Take 200 mg by mouth At Bedtime      !! lamoTRIgine (LAMICTAL) 25 MG tablet Take 25 mg by mouth every morning      multivitamin, therapeutic (THERA-VIT) TABS tablet Take 1 tablet by mouth every morning      naltrexone (DEPADE/REVIA) 50 MG tablet Take 100 mg by mouth At Bedtime      OLANZapine (ZYPREXA) 10 MG tablet Take 5 mg by mouth At Bedtime      omeprazole (PRILOSEC) 20 MG DR capsule Take 20 mg by mouth every morning (before breakfast)      Vitamin D3 (CHOLECALCIFEROL) 125 MCG (5000 UT) tablet Take 1 tablet by mouth daily       !! - Potential duplicate medications found.  "Please discuss with provider.      STOP taking these medications       Creatine POWD Comments:   Reason for Stopping:         diphenhydrAMINE (BENADRYL) 25 MG tablet Comments:   Reason for Stopping:         nicotine (NICODERM CQ) 21 MG/24HR 24 hr patch Comments:   Reason for Stopping:         nicotine (NICOTROL) 10 MG inhaler Comments:   Reason for Stopping:                  VITALS   Vitals: /72   Pulse 58   Temp 96.9  F (36.1  C) (Tympanic)   Resp 16   Ht 1.905 m (6' 3\")   Wt 87.8 kg (193 lb 9.6 oz)   SpO2 98%   BMI 24.20 kg/m       MENTAL STATUS EXAM   Appearance: Alert, oriented, dressed in hospital scrubs, appears stated age   Attitude: Cooperative   Eye Contact: Good  Mood: \"Better\"  Affect: Full range of affect  Speech: Normal rate and rhythm   Psychomotor Behavior: No tremor, rigidity, or psychomotor abnormality   Thought Process: Logical, goal directed   Associations: No loose associations   Thought Content: Denies SI or plan. No SIB. Denies A/V hallucinations. No evidence of delusional thought.  Insight: Good  Judgment: Good  Oriented to: Person, place, and time  Attention Span and Concentration: Intact  Recent and Remote Memory: Intact  Language: English with appropriate syntax and vocabulary  Fund of Knowledge: Average  Muscle Strength and Tone: Grossly normal  Gait and Station: Grossly normal       DISCHARGE PLAN     1.  Education given regarding diagnostic and treatment options with risks, benefits and alternatives with adequate verbalization of understanding.  2.  Discharge to home. Upon detailed review of risk factors, patient amenable for release.   3.  Continue aforementioned medications and associated medication changes with follow-up by outpatient provider.  4.  Crisis management planning in place.    5.  Nursing and  to review further discharge recommendations.   6.  Active issues: No active medical issues requiring immediate follow-up care.  7.  Patient is being " discharged with the following appointments as detailed below:    See AVS       DISCHARGE SERVICES PROVIDED     80 minutes spent on discharge services, including:  Final examination of patient.  Review and discussion of hospital stay.  Instructions for continued outpatient care/goals.  Preparation of discharge records.  Preparation of medications refills and new prescriptions.  Preparation of applicable referral forms.        ATTESTATION   Tahir Meyer MD  [facility]   Psychiatry    Video Visit: Patient has given verbal consent for video visit?: Yes  Type of Service: video visit for mental health treatment  Reason for Video Visit: COVID-19 [place]  Originating Site (patient location): [facility]  Distant Site (provider location): Remote Location  Mode of Communication: Video Conference via [employer]  Time of Service: Date: June 28, 2023 , Start:07:00 end: 08:00     LABS THIS ADMISSION     Results for orders placed or performed during the hospital encounter of 06/25/23   Valproic acid     Status: Normal   Result Value Ref Range    Valproic acid 67.2   ug/mL

## 2023-06-28 NOTE — PLAN OF CARE
Problem: Adult Behavioral Health Plan of Care  Goal: Patient-Specific Goal (Individualization)  Description: Patient will be compliant with medication administrations and treatment team recommendations during hospitalization.  Patient will remain independent with ADLs daily.  Patient will sleep 6-8 hours per night.  Patient will eat at least 50% of meals daily.  Patient will attend at least 50% of unit programming daily.   Outcome: Progressing   Goal Outcome Evaluation:               Face to face shift report received from RN. Rounding completed, pt observed. Client rested in room for 6.5 hours with eyes closed and respirations noted.Face to face report will be communicated to oncoming RN.    Chuy Mejia RN  6/28/2023  6:34 AM

## 2023-07-04 ENCOUNTER — TELEPHONE (OUTPATIENT)
Dept: BEHAVIORAL HEALTH | Facility: HOSPITAL | Age: 43
End: 2023-07-04

## 2023-07-04 NOTE — TELEPHONE ENCOUNTER
"[facility]: Post-Hospital Discharge Note     Situation   Post hospital discharge call placed 07/04/23.      Krystian did not answer. A message was left.  Messages are left with a call back number should they need to reach staff with questions, need additional resources, or need assistance setting up a post hospitalization follow up appointment ,with their provider, if they are unable to do so independently.    Inpatient Mental Health Admission Information:  Admission Date: 6/25/23  Admission Reason: suicidal ideation      Inpatient Mental Health Discharge Information:  Discharge Date: 6/28/23  Discharge Diagnosis:   -Bipolar I Disorder, Current Episode Depressed  -Alcohol Use Disorder, Severe   Discharged to: home    Background    The following information is obtained from the hospital after visit summary and inpatient provider notes.     \"Jose Raul Owens is a 42 year old male with a past psychiatric history notable for bipolar I disorder, alcohol use disorder. Multiple prior inpatient psychiatric hospitalizations. Prior CD treatments. History of DUI in 2017. Presents to outside ED with complaints of worsening depressive symptoms and increase frequency and intensity of suicidal ideation. Recently relapsed on alcohol and began drinking again after several month period of sobriety, no longer attending his 12-step program meetings on Friday evenings. Patient was subsequently transferred and accepted for inpatient psychiatric hospitalization at [facility] for further safety and further stabilization.   Patient was admitted to unit 5 due to the aforementioned presentation. The patient was placed under 15 minute checks to ensure patient safety. The patient participated in unit programming and groups as able.  Legal status during hospitalization was voluntary .Mr. Owens did not require seclusion/restraint during hospitalization.   We reviewed with Mr. Owens current and past " medication trials including duration, dose, response and side effects. During this hospitalization, the following changes to the patient's psychotropic medications were made:     PTA medications continued/changed:   -depakote 1,500 mg at bedtime (Depakote level at 67.2, slightly low but was just increased within last 8 weeks to address manic symptoms.  Encouraged him to continue to work with his outpatient provider and increase to 2000 mg if needed. )  Gabapentin 400 mg at bedtime  Lamotrigine 25 mg q AM and 200 mg at bedtime   Naltrexone 100 mg q daily  Olanzapine 5 mg at bedtime      New medications tried and stopped:   -None     New medications initiated:   -none     Mr. Owens progressed gradually related to confidence in skills to manage symptoms and establishment of a supportive community network . By the time of discharge, his symptoms had improved adequately.  Depression improved with increased confidence in ability to manage symptoms., Suicidal ideation resolved with adequate outpatient plan in place.  and Psychotropic medications utilized were found to be helpful without significant adverse side effects. The treatment goals (long-term goal/discharge criteria) were reached.      With the aforementioned changes and supports the patient noticed improvement in their symptoms and felt sufficiently ready for discharge. As a result, Krystian Owens was discharged. At the time of discharge, Krystian Owens was determined to not be a danger to self or others. The patient was also medically stable for discharge. At the current time of discharge, the patient does not meet criteria for involuntary hospitalization. On the day of discharge, the patient reports that they do not have suicidal or homicidal ideation. Steps taken to minimize risk include: assessing patient s behavior and thought process daily during hospital stay, discharging patient with adequate plan for follow up for mental and physical health and  "discussing safety plan of returning to the hospital should the patient ever have thoughts of harming themselves or others. Therefore, based on all available evidence including the factors cited above, the patient does not appear to be at imminent risk for self-harm, and is appropriate for outpatient level of care. The acute crisis is resolved and Krystian is discharging in improved condition. Though Krystian's acute crisis has resolved, there remains a higher risk of suicide over the long term compared to the general population. Factors that may be associated with relapse include worsening of depressive symptoms, alcohol use, illicit substance use, not taking medications, lack of mental health follow-up appointments and work/family/relationship stressors. Krystian Owens has a plan in place to mitigate these stressors, is hopeful about the future ,and is not assessed to be a imminent risk to self or anyone else and thus is not assessed to be holdable.  Krystian has received maximal benefit from the current hospital stay. Krystian Owens set to discharge.\"    Post Discharge Assessment   Unable to complete assessment.  Attempted to call Krystian.  He didn't answer.  A message was left.   Discharge Medication Assessment   Unable to complete assessment.  Attempted to call Krystain.  He didn't answer.  A message was left.   Medications were reviewed in full on discharge, including: Medications to be started, medications to be stopped, medications to be continued from preadmission and any side effects.      Prescriptions were e-scribed or sent to their preferred pharmacy at discharge and were able to be filled: NA-no medications sent to pharmacy.    Outpatient Plan   While inpatient, declined to meet with CD . Did receive resources for AA and Smart Recovery.   Discharge follow up appointment scheduled within 14 days of discharging from hospital? No: left message to help Krystian arrange any appointments if he is unable to " do so himself.    Further information, barriers, or follow up this writer has addressed: N/A        Francine MCCALL, RN  [facility]  07/04/23